# Patient Record
Sex: MALE | Race: WHITE | NOT HISPANIC OR LATINO | URBAN - METROPOLITAN AREA
[De-identification: names, ages, dates, MRNs, and addresses within clinical notes are randomized per-mention and may not be internally consistent; named-entity substitution may affect disease eponyms.]

---

## 2023-03-16 ENCOUNTER — NURSING HOME VISIT (OUTPATIENT)
Dept: POST ACUTE CARE | Facility: EXTERNAL LOCATION | Age: 73
End: 2023-03-16
Payer: MEDICARE

## 2023-03-16 DIAGNOSIS — R53.1 WEAKNESS: ICD-10-CM

## 2023-03-16 DIAGNOSIS — E11.9 TYPE 2 DIABETES MELLITUS WITHOUT COMPLICATION, WITHOUT LONG-TERM CURRENT USE OF INSULIN (MULTI): ICD-10-CM

## 2023-03-16 DIAGNOSIS — M51.9 LUMBAR DISC DISEASE: Primary | ICD-10-CM

## 2023-03-16 PROBLEM — E78.5 HYPERLIPIDEMIA: Status: ACTIVE | Noted: 2023-03-16

## 2023-03-16 PROBLEM — J44.9 COPD (CHRONIC OBSTRUCTIVE PULMONARY DISEASE) (MULTI): Status: ACTIVE | Noted: 2023-03-16

## 2023-03-16 PROBLEM — I63.9 CVA (CEREBRAL VASCULAR ACCIDENT) (MULTI): Status: ACTIVE | Noted: 2023-03-16

## 2023-03-16 PROBLEM — I10 PRIMARY HYPERTENSION: Status: ACTIVE | Noted: 2023-03-16

## 2023-03-16 PROBLEM — I73.9 PVD (PERIPHERAL VASCULAR DISEASE) (CMS-HCC): Status: ACTIVE | Noted: 2023-03-16

## 2023-03-16 PROCEDURE — 99308 SBSQ NF CARE LOW MDM 20: CPT | Performed by: NURSE PRACTITIONER

## 2023-03-16 ASSESSMENT — ENCOUNTER SYMPTOMS
CARDIOVASCULAR NEGATIVE: 1
GASTROINTESTINAL NEGATIVE: 1
BACK PAIN: 1
ARTHRALGIAS: 1
RESPIRATORY NEGATIVE: 1

## 2023-03-16 NOTE — LETTER
"Patient: Leopoldo Wells  : 1950    Encounter Date: 2023    Subjective  Patient ID: Leopoldo Wells is a 72 y.o. male who presents for No chief complaint on file..  71 yo male with history of DMII, HTN, hyperlipidemia, CVA, lumbar disease disease, COPD.  Resident admitted to [facility] rehab for weakness.          Review of Systems   Respiratory: Negative.     Cardiovascular: Negative.    Gastrointestinal: Negative.    Musculoskeletal:  Positive for arthralgias, back pain and gait problem.        States he has been seen by pain mgmt previously and \"fired\" them.  States nothing has helped his pain except for Oxycodone.         Objective  Physical Exam  Constitutional:       General: He is not in acute distress.  Cardiovascular:      Rate and Rhythm: Normal rate and regular rhythm.      Heart sounds: Normal heart sounds.   Pulmonary:      Effort: Pulmonary effort is normal.      Breath sounds: Normal breath sounds.   Abdominal:      General: Bowel sounds are normal.   Musculoskeletal:      Right lower leg: No edema.      Left lower leg: No edema.      Comments: Reports he is having pain 7.5/10 in lower back.  He was recently given oral pain meds.    Neurological:      Mental Status: He is alert.         No current outpatient medications on file.     Assessment/Plan  Problem List Items Addressed This Visit          Musculoskeletal    Lumbar disc disease - Primary       Endocrine/Metabolic    Type 2 diabetes mellitus without complication, without long-term current use of insulin (CMS/[facility])       Other    Weakness     Will continue with physical therapy.  Continue to monitor.  Get labs and re-evaluate.          Electronically Signed By: ISAK Damon   3/16/23  2:13 PM  "

## 2023-03-16 NOTE — PROGRESS NOTES
"Subjective   Patient ID: Leopoldo Wells is a 72 y.o. male who presents for No chief complaint on file..  71 yo male with history of DMII, HTN, hyperlipidemia, CVA, lumbar disease disease, COPD.  Resident admitted to [facility] rehab for weakness.          Review of Systems   Respiratory: Negative.     Cardiovascular: Negative.    Gastrointestinal: Negative.    Musculoskeletal:  Positive for arthralgias, back pain and gait problem.        States he has been seen by pain mgmt previously and \"fired\" them.  States nothing has helped his pain except for Oxycodone.         Objective   Physical Exam  Constitutional:       General: He is not in acute distress.  Cardiovascular:      Rate and Rhythm: Normal rate and regular rhythm.      Heart sounds: Normal heart sounds.   Pulmonary:      Effort: Pulmonary effort is normal.      Breath sounds: Normal breath sounds.   Abdominal:      General: Bowel sounds are normal.   Musculoskeletal:      Right lower leg: No edema.      Left lower leg: No edema.      Comments: Reports he is having pain 7.5/10 in lower back.  He was recently given oral pain meds.    Neurological:      Mental Status: He is alert.         No current outpatient medications on file.     Assessment/Plan   Problem List Items Addressed This Visit          Musculoskeletal    Lumbar disc disease - Primary       Endocrine/Metabolic    Type 2 diabetes mellitus without complication, without long-term current use of insulin (CMS/[facility])       Other    Weakness     Will continue with physical therapy.  Continue to monitor.  Get labs and re-evaluate.      "

## 2023-03-17 ENCOUNTER — NURSING HOME VISIT (OUTPATIENT)
Dept: PRIMARY CARE | Facility: CLINIC | Age: 73
End: 2023-03-17
Payer: MEDICARE

## 2023-03-17 DIAGNOSIS — R05.1 ACUTE COUGH: Primary | ICD-10-CM

## 2023-03-17 DIAGNOSIS — I63.9 CEREBROVASCULAR ACCIDENT (CVA), UNSPECIFIED MECHANISM (MULTI): ICD-10-CM

## 2023-03-17 PROCEDURE — 99305 1ST NF CARE MODERATE MDM 35: CPT | Performed by: INTERNAL MEDICINE

## 2023-03-17 ASSESSMENT — ENCOUNTER SYMPTOMS
HEMATOLOGIC/LYMPHATIC NEGATIVE: 1
MUSCULOSKELETAL NEGATIVE: 1
AGITATION: 0
NEUROLOGICAL NEGATIVE: 1
SHORTNESS OF BREATH: 0
NAUSEA: 0
FEVER: 0
ENDOCRINE NEGATIVE: 1
HEADACHES: 0
ABDOMINAL PAIN: 0
BACK PAIN: 0
LIGHT-HEADEDNESS: 0
CONSTITUTIONAL NEGATIVE: 1
ADENOPATHY: 0
EYE DISCHARGE: 0
ALLERGIC/IMMUNOLOGIC NEGATIVE: 1
PSYCHIATRIC NEGATIVE: 1
GASTROINTESTINAL NEGATIVE: 1
JOINT SWELLING: 0
DIARRHEA: 0
CHILLS: 0
CARDIOVASCULAR NEGATIVE: 1
NUMBNESS: 0
CONSTIPATION: 0
NECK STIFFNESS: 0
COUGH: 1
ABDOMINAL DISTENTION: 0
WHEEZING: 0
PALPITATIONS: 0
VOMITING: 0
EYES NEGATIVE: 1
CONFUSION: 0
DYSURIA: 0

## 2023-03-17 NOTE — PROGRESS NOTES
Subjective   Patient ID: Leopoldo Wells is a 72 y.o. male who presents for No chief complaint on file..  WITH PMH CAD CVA PVD DM2 LBP HTN DERICK HERE FOR REHAB AFTER ARF, C/O FREQUENT COUGH NO SOB NO FEVER        Review of Systems   Constitutional: Negative.  Negative for chills and fever.   HENT: Negative.  Negative for congestion.    Eyes: Negative.  Negative for discharge.   Respiratory:  Positive for cough. Negative for shortness of breath and wheezing.    Cardiovascular: Negative.  Negative for chest pain, palpitations and leg swelling.   Gastrointestinal: Negative.  Negative for abdominal distention, abdominal pain, constipation, diarrhea, nausea and vomiting.   Endocrine: Negative.    Genitourinary: Negative.  Negative for dysuria and urgency.   Musculoskeletal: Negative.  Negative for back pain, joint swelling and neck stiffness.   Skin: Negative.  Negative for rash.   Allergic/Immunologic: Negative.  Negative for immunocompromised state.   Neurological: Negative.  Negative for light-headedness, numbness and headaches.   Hematological: Negative.  Negative for adenopathy.   Psychiatric/Behavioral: Negative.  Negative for agitation, behavioral problems and confusion.    All other systems reviewed and are negative.      Objective   Physical Exam  Vitals reviewed.   Constitutional:       General: He is not in acute distress.     Appearance: Normal appearance.   HENT:      Head: Normocephalic and atraumatic.      Nose: Nose normal.   Eyes:      Conjunctiva/sclera: Conjunctivae normal.      Pupils: Pupils are equal, round, and reactive to light.   Neck:      Vascular: No carotid bruit.   Cardiovascular:      Rate and Rhythm: Normal rate and regular rhythm.      Pulses: Normal pulses.      Heart sounds:      No gallop.   Pulmonary:      Effort: Pulmonary effort is normal. No respiratory distress.      Breath sounds: Normal breath sounds. No wheezing.   Abdominal:      General: Bowel sounds are normal.      Palpations:  Abdomen is soft.      Tenderness: There is no abdominal tenderness.   Musculoskeletal:         General: Normal range of motion.      Cervical back: Normal range of motion. No rigidity.   Lymphadenopathy:      Cervical: No cervical adenopathy.   Skin:     General: Skin is warm.      Findings: No rash.   Neurological:      Mental Status: He is alert and oriented to person, place, and time.      Comments: LEFT SIDE FACE DROOPING MOTOR 5/5   Psychiatric:         Mood and Affect: Mood normal.         Behavior: Behavior normal.       PMH, FH, SH REVIEWED FROM [facility] CHART  MEDICATIONS REVIEWED  SPOKE WITH NURSE ABOUT THE PATIENT RECENT WELLBEING AND CONDITION  Assessment/Plan   Problem List Items Addressed This Visit          Nervous    CVA (cerebral vascular accident) (CMS/[facility])     Other Visit Diagnoses       Acute cough    -  Primary           CXR MUCINEX  CONTINUE SAME MANAGEMENT

## 2023-03-20 ENCOUNTER — NURSING HOME VISIT (OUTPATIENT)
Dept: POST ACUTE CARE | Facility: EXTERNAL LOCATION | Age: 73
End: 2023-03-20
Payer: MEDICARE

## 2023-03-20 DIAGNOSIS — I63.9 CEREBROVASCULAR ACCIDENT (CVA), UNSPECIFIED MECHANISM (MULTI): ICD-10-CM

## 2023-03-20 DIAGNOSIS — M51.9 LUMBAR DISC DISEASE: Primary | ICD-10-CM

## 2023-03-20 PROBLEM — R05.3 CHRONIC COUGH: Status: ACTIVE | Noted: 2023-03-20

## 2023-03-20 PROCEDURE — 99307 SBSQ NF CARE SF MDM 10: CPT | Performed by: NURSE PRACTITIONER

## 2023-03-20 ASSESSMENT — ENCOUNTER SYMPTOMS
CARDIOVASCULAR NEGATIVE: 1
APPETITE CHANGE: 0
GASTROINTESTINAL NEGATIVE: 1
BACK PAIN: 1
COUGH: 1

## 2023-03-20 NOTE — PROGRESS NOTES
Subjective   Patient ID: Leopoldo Wells is a 72 y.o. male who presents for No chief complaint on file..  73 yo male with history of cva, pvd, lumbago, and cough, on rehab unit at [facility].  Resident states his pain is better controlled.  Working well with physical therapy and cough has improved since given Mucinex over the weekend.  States he is working with speech therapy due to some dysphagia which has also improved.         Review of Systems   Constitutional:  Negative for appetite change.   Respiratory:  Positive for cough.    Cardiovascular: Negative.    Gastrointestinal: Negative.    Musculoskeletal:  Positive for back pain.       Objective   Physical Exam  Cardiovascular:      Rate and Rhythm: Normal rate and regular rhythm.   Pulmonary:      Effort: Pulmonary effort is normal.      Comments: Slight wheeze heard in right middle lobe on forced expiration.    Abdominal:      General: Bowel sounds are normal.   Neurological:      Mental Status: He is alert.   Psychiatric:         Mood and Affect: Mood normal.         No current outpatient medications on file.     Assessment/Plan   Problem List Items Addressed This Visit          Nervous    CVA (cerebral vascular accident) (CMS/HCC)       Musculoskeletal    Lumbar disc disease - Primary     Continue same regimen.  Continue same medications and continue to monitor.

## 2023-03-20 NOTE — LETTER
Patient: Leopoldo Wells  : 1950    Encounter Date: 2023    Subjective  Patient ID: Leopoldo Wells is a 72 y.o. male who presents for No chief complaint on file..  71 yo male with history of cva, pvd, lumbago, and cough, on rehab unit at [facility].  Resident states his pain is better controlled.  Working well with physical therapy and cough has improved since given Mucinex over the weekend.  States he is working with speech therapy due to some dysphagia which has also improved.         Review of Systems   Constitutional:  Negative for appetite change.   Respiratory:  Positive for cough.    Cardiovascular: Negative.    Gastrointestinal: Negative.    Musculoskeletal:  Positive for back pain.       Objective  Physical Exam  Cardiovascular:      Rate and Rhythm: Normal rate and regular rhythm.   Pulmonary:      Effort: Pulmonary effort is normal.      Comments: Slight wheeze heard in right middle lobe on forced expiration.    Abdominal:      General: Bowel sounds are normal.   Neurological:      Mental Status: He is alert.   Psychiatric:         Mood and Affect: Mood normal.         No current outpatient medications on file.     Assessment/Plan  Problem List Items Addressed This Visit          Nervous    CVA (cerebral vascular accident) (CMS/[facility])       Musculoskeletal    Lumbar disc disease - Primary     Continue same regimen.  Continue same medications and continue to monitor.           Electronically Signed By: ISAK Damon   3/20/23  3:47 PM

## 2023-03-21 ENCOUNTER — NURSING HOME VISIT (OUTPATIENT)
Dept: POST ACUTE CARE | Facility: EXTERNAL LOCATION | Age: 73
End: 2023-03-21
Payer: MEDICARE

## 2023-03-21 DIAGNOSIS — D50.9 IRON DEFICIENCY ANEMIA, UNSPECIFIED IRON DEFICIENCY ANEMIA TYPE: Primary | ICD-10-CM

## 2023-03-21 PROCEDURE — 99308 SBSQ NF CARE LOW MDM 20: CPT | Performed by: NURSE PRACTITIONER

## 2023-03-21 ASSESSMENT — ENCOUNTER SYMPTOMS
WEAKNESS: 1
GASTROINTESTINAL NEGATIVE: 1
FATIGUE: 0
CARDIOVASCULAR NEGATIVE: 1
APPETITE CHANGE: 0
RESPIRATORY NEGATIVE: 1

## 2023-03-21 NOTE — LETTER
Patient: Leopoldo Wells  : 1950    Encounter Date: 2023    Subjective  Patient ID: Leopoldo Wells is a 72 y.o. male who presents for No chief complaint on file..  71 yo male at [facility] on rehab unit after CVA.  Labs reviewed with resident.  Resident reports he had previous history of anemia however, his PCP informed him that he no longer needed to be on iron supplements.  States he had a colonoscopy approximately 1 year ago which showed polyps.  Resident denies any increased sob, abd. Pain, blood in stool.          Review of Systems   Constitutional:  Negative for appetite change and fatigue.   Respiratory: Negative.     Cardiovascular: Negative.    Gastrointestinal: Negative.    Neurological:  Positive for weakness.        Weakness improving with physical therapy       Objective  Physical Exam  Constitutional:       Appearance: Normal appearance.   Cardiovascular:      Heart sounds: Normal heart sounds.   Pulmonary:      Effort: Pulmonary effort is normal.      Breath sounds: Normal breath sounds.   Abdominal:      General: Bowel sounds are normal.      Tenderness: There is no abdominal tenderness.   Neurological:      Mental Status: He is alert.         No current outpatient medications on file.     Assessment/Plan  Problem List Items Addressed This Visit          Hematologic    Iron deficiency anemia - Primary     Labs reviewed with resident.  Will get occult stool.  Start on Ferrous Sulfate 325mg once daily.  Get results from previous colonoscopy to review.  Continue to monitor.           Electronically Signed By: ISAK Damon   3/21/23 12:27 PM

## 2023-03-21 NOTE — PROGRESS NOTES
Subjective   Patient ID: Leopoldo Wells is a 72 y.o. male who presents for No chief complaint on file..  71 yo male at [facility] on rehab unit after CVA.  Labs reviewed with resident.  Resident reports he had previous history of anemia however, his PCP informed him that he no longer needed to be on iron supplements.  States he had a colonoscopy approximately 1 year ago which showed polyps.  Resident denies any increased sob, abd. Pain, blood in stool.          Review of Systems   Constitutional:  Negative for appetite change and fatigue.   Respiratory: Negative.     Cardiovascular: Negative.    Gastrointestinal: Negative.    Neurological:  Positive for weakness.        Weakness improving with physical therapy       Objective   Physical Exam  Constitutional:       Appearance: Normal appearance.   Cardiovascular:      Heart sounds: Normal heart sounds.   Pulmonary:      Effort: Pulmonary effort is normal.      Breath sounds: Normal breath sounds.   Abdominal:      General: Bowel sounds are normal.      Tenderness: There is no abdominal tenderness.   Neurological:      Mental Status: He is alert.         No current outpatient medications on file.     Assessment/Plan   Problem List Items Addressed This Visit          Hematologic    Iron deficiency anemia - Primary     Labs reviewed with resident.  Will get occult stool.  Start on Ferrous Sulfate 325mg once daily.  Get results from previous colonoscopy to review.  Continue to monitor.

## 2023-03-31 ENCOUNTER — NURSING HOME VISIT (OUTPATIENT)
Dept: POST ACUTE CARE | Facility: EXTERNAL LOCATION | Age: 73
End: 2023-03-31
Payer: MEDICARE

## 2023-03-31 DIAGNOSIS — I63.9 CEREBROVASCULAR ACCIDENT (CVA), UNSPECIFIED MECHANISM (MULTI): Primary | ICD-10-CM

## 2023-03-31 DIAGNOSIS — J44.9 CHRONIC OBSTRUCTIVE PULMONARY DISEASE, UNSPECIFIED COPD TYPE (MULTI): ICD-10-CM

## 2023-03-31 PROCEDURE — 99308 SBSQ NF CARE LOW MDM 20: CPT | Performed by: INTERNAL MEDICINE

## 2023-03-31 NOTE — PROGRESS NOTES
Pt is doing fine , no complaint  GA: Comfortable, no distress  ROS: No SOB  Medications reviewed  Head: Normal  Neck: Soft  Heart: Regular  Lungs: Clear  Abdomen: soft    Impression: clinically doing fine, continue current management    Problem List Items Addressed This Visit          Nervous    CVA (cerebral vascular accident) (CMS/[facility]) - Primary       Respiratory    COPD (chronic obstructive pulmonary disease) (CMS/[facility])

## 2023-03-31 NOTE — LETTER
Patient: Leopoldo Wells  : 1950    Encounter Date: 2023    Pt is doing fine , no complaint  GA: Comfortable, no distress  ROS: No SOB  Medications reviewed  Head: Normal  Neck: Soft  Heart: Regular  Lungs: Clear  Abdomen: soft    Impression: clinically doing fine, continue current management    Problem List Items Addressed This Visit          Nervous    CVA (cerebral vascular accident) (CMS/[facility]) - Primary       Respiratory    COPD (chronic obstructive pulmonary disease) (CMS/[facility])          Electronically Signed By: Jett Coronado MD   3/31/23  7:06 PM

## 2023-04-05 ENCOUNTER — NURSING HOME VISIT (OUTPATIENT)
Dept: POST ACUTE CARE | Facility: EXTERNAL LOCATION | Age: 73
End: 2023-04-05
Payer: MEDICARE

## 2023-04-05 DIAGNOSIS — J44.9 CHRONIC OBSTRUCTIVE PULMONARY DISEASE, UNSPECIFIED COPD TYPE (MULTI): ICD-10-CM

## 2023-04-05 DIAGNOSIS — R41.0 CONFUSION: ICD-10-CM

## 2023-04-05 DIAGNOSIS — I63.9 CEREBROVASCULAR ACCIDENT (CVA), UNSPECIFIED MECHANISM (MULTI): Primary | ICD-10-CM

## 2023-04-05 PROCEDURE — 99308 SBSQ NF CARE LOW MDM 20: CPT | Performed by: NURSE PRACTITIONER

## 2023-04-05 NOTE — LETTER
Patient: Leopoldo Wells  : 1950    Encounter Date: 2023    Subjective  Patient ID: Leopoldo Wells is a 72 y.o. male who presents for No chief complaint on file..  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  Staff verbalizes concern for confusion yesterday; believing his remote control was his call light.  Resident denies any recent confusion.          Review of Systems   Constitutional:  Negative for appetite change, chills and fever.   Respiratory: Negative.     Cardiovascular: Negative.    Gastrointestinal: Negative.    Neurological:  Positive for weakness. Negative for dizziness, syncope, speech difficulty and headaches.        Reports weakness is improving with physical therapy.   Psychiatric/Behavioral:  Negative for confusion and dysphoric mood. The patient is not nervous/anxious.        Objective  Physical Exam  Constitutional:       General: He is not in acute distress.  Cardiovascular:      Rate and Rhythm: Regular rhythm.   Pulmonary:      Effort: Pulmonary effort is normal.      Breath sounds: Normal breath sounds.   Abdominal:      General: Bowel sounds are normal.   Neurological:      Mental Status: He is alert and oriented to person, place, and time.   Psychiatric:         Mood and Affect: Mood normal.         Behavior: Behavior normal.         No current outpatient medications on file.     Assessment/Plan  Problem List Items Addressed This Visit          Nervous    CVA (cerebral vascular accident) (CMS/[facility]) - Primary       Respiratory    COPD (chronic obstructive pulmonary disease) (CMS/[facility])     Other Visit Diagnoses       Confusion            Continue same regimen at this time.  Physical therapy continued.  Will get labs and UA C&S for confusion.  Continue to monitor             Electronically Signed By: ISAK Damon   23  8:59 AM

## 2023-04-08 ASSESSMENT — ENCOUNTER SYMPTOMS
DIZZINESS: 0
CONFUSION: 0
SPEECH DIFFICULTY: 0
FEVER: 0
CHILLS: 0
NERVOUS/ANXIOUS: 0
CARDIOVASCULAR NEGATIVE: 1
RESPIRATORY NEGATIVE: 1
APPETITE CHANGE: 0
WEAKNESS: 1
DYSPHORIC MOOD: 0
GASTROINTESTINAL NEGATIVE: 1
HEADACHES: 0

## 2023-04-08 NOTE — PROGRESS NOTES
Subjective   Patient ID: Leopoldo Wells is a 72 y.o. male who presents for No chief complaint on file..  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  Staff verbalizes concern for confusion yesterday; believing his remote control was his call light.  Resident denies any recent confusion.          Review of Systems   Constitutional:  Negative for appetite change, chills and fever.   Respiratory: Negative.     Cardiovascular: Negative.    Gastrointestinal: Negative.    Neurological:  Positive for weakness. Negative for dizziness, syncope, speech difficulty and headaches.        Reports weakness is improving with physical therapy.   Psychiatric/Behavioral:  Negative for confusion and dysphoric mood. The patient is not nervous/anxious.        Objective   Physical Exam  Constitutional:       General: He is not in acute distress.  Cardiovascular:      Rate and Rhythm: Regular rhythm.   Pulmonary:      Effort: Pulmonary effort is normal.      Breath sounds: Normal breath sounds.   Abdominal:      General: Bowel sounds are normal.   Neurological:      Mental Status: He is alert and oriented to person, place, and time.   Psychiatric:         Mood and Affect: Mood normal.         Behavior: Behavior normal.         No current outpatient medications on file.     Assessment/Plan   Problem List Items Addressed This Visit          Nervous    CVA (cerebral vascular accident) (CMS/HCC) - Primary       Respiratory    COPD (chronic obstructive pulmonary disease) (CMS/[facility])     Other Visit Diagnoses       Confusion            Continue same regimen at this time.  Physical therapy continued.  Will get labs and UA C&S for confusion.  Continue to monitor

## 2023-04-26 ENCOUNTER — NURSING HOME VISIT (OUTPATIENT)
Dept: POST ACUTE CARE | Facility: EXTERNAL LOCATION | Age: 73
End: 2023-04-26
Payer: MEDICARE

## 2023-04-26 DIAGNOSIS — D50.9 IRON DEFICIENCY ANEMIA, UNSPECIFIED IRON DEFICIENCY ANEMIA TYPE: Primary | ICD-10-CM

## 2023-04-26 DIAGNOSIS — I73.9 PVD (PERIPHERAL VASCULAR DISEASE) (CMS-HCC): ICD-10-CM

## 2023-04-26 PROCEDURE — 99309 SBSQ NF CARE MODERATE MDM 30: CPT | Performed by: INTERNAL MEDICINE

## 2023-04-26 ASSESSMENT — ENCOUNTER SYMPTOMS
LIGHT-HEADEDNESS: 0
BACK PAIN: 0
CONSTITUTIONAL NEGATIVE: 1
EYES NEGATIVE: 1
CHILLS: 0
EYE DISCHARGE: 0
SHORTNESS OF BREATH: 0
CONSTIPATION: 0
NEUROLOGICAL NEGATIVE: 1
FEVER: 0
NUMBNESS: 0
ABDOMINAL DISTENTION: 0
AGITATION: 0
ADENOPATHY: 0
MUSCULOSKELETAL NEGATIVE: 1
VOMITING: 0
ENDOCRINE NEGATIVE: 1
PSYCHIATRIC NEGATIVE: 1
ABDOMINAL PAIN: 0
CONFUSION: 0
JOINT SWELLING: 0
COUGH: 0
ALLERGIC/IMMUNOLOGIC NEGATIVE: 1
RESPIRATORY NEGATIVE: 1
HEMATOLOGIC/LYMPHATIC NEGATIVE: 1
NECK STIFFNESS: 0
HEADACHES: 0
GASTROINTESTINAL NEGATIVE: 1
PALPITATIONS: 0
DIARRHEA: 0
WHEEZING: 0
DYSURIA: 0
CARDIOVASCULAR NEGATIVE: 1
NAUSEA: 0

## 2023-04-26 NOTE — PROGRESS NOTES
Subjective   Patient ID: Leopoldo Wells is a 72 y.o. male who presents for No chief complaint on file..  PT WAS SEEN IN [facility] NO COMPLAINT        Review of Systems   Constitutional: Negative.  Negative for chills and fever.   HENT: Negative.  Negative for congestion.    Eyes: Negative.  Negative for discharge.   Respiratory: Negative.  Negative for cough, shortness of breath and wheezing.    Cardiovascular: Negative.  Negative for chest pain, palpitations and leg swelling.   Gastrointestinal: Negative.  Negative for abdominal distention, abdominal pain, constipation, diarrhea, nausea and vomiting.   Endocrine: Negative.    Genitourinary: Negative.  Negative for dysuria and urgency.   Musculoskeletal: Negative.  Negative for back pain, joint swelling and neck stiffness.   Skin: Negative.  Negative for rash.   Allergic/Immunologic: Negative.  Negative for immunocompromised state.   Neurological: Negative.  Negative for light-headedness, numbness and headaches.   Hematological: Negative.  Negative for adenopathy.   Psychiatric/Behavioral: Negative.  Negative for agitation, behavioral problems and confusion.    All other systems reviewed and are negative.      Objective   Physical Exam  Vitals reviewed.   Constitutional:       General: He is not in acute distress.     Appearance: Normal appearance.   HENT:      Head: Normocephalic and atraumatic.      Nose: Nose normal.   Eyes:      Conjunctiva/sclera: Conjunctivae normal.      Pupils: Pupils are equal, round, and reactive to light.   Neck:      Vascular: No carotid bruit.   Cardiovascular:      Rate and Rhythm: Normal rate and regular rhythm.      Pulses: Normal pulses.      Heart sounds:      No gallop.   Pulmonary:      Effort: Pulmonary effort is normal. No respiratory distress.      Breath sounds: Normal breath sounds. No wheezing.   Abdominal:      General: Bowel sounds are normal.      Palpations: Abdomen is soft.      Tenderness: There is no abdominal  tenderness.   Musculoskeletal:         General: Normal range of motion.      Cervical back: Normal range of motion. No rigidity.   Lymphadenopathy:      Cervical: No cervical adenopathy.   Skin:     General: Skin is warm.      Findings: No rash.   Neurological:      General: No focal deficit present.      Mental Status: He is alert and oriented to person, place, and time.   Psychiatric:         Mood and Affect: Mood normal.         Behavior: Behavior normal.       PMH, FH, SH REVIEWED FROM [facility] CHART  MEDICATIONS REVIEWED  SPOKE WITH NURSE ABOUT THE PATIENT RECENT WELLBEING AND CONDITION  Assessment/Plan   Problem List Items Addressed This Visit          Circulatory    PVD (peripheral vascular disease) (CMS/HCC)       Hematologic    Iron deficiency anemia - Primary    Relevant Orders    Colonoscopy    EGD    Labs reviewed with pt      CONTINUE SAME MANAGEMENT

## 2023-04-26 NOTE — LETTER
Patient: Leopoldo Wells  : 1950    Encounter Date: 2023    Subjective  Patient ID: Leopoldo Wells is a 72 y.o. male who presents for No chief complaint on file..  PT WAS SEEN IN [facility] NO COMPLAINT        Review of Systems   Constitutional: Negative.  Negative for chills and fever.   HENT: Negative.  Negative for congestion.    Eyes: Negative.  Negative for discharge.   Respiratory: Negative.  Negative for cough, shortness of breath and wheezing.    Cardiovascular: Negative.  Negative for chest pain, palpitations and leg swelling.   Gastrointestinal: Negative.  Negative for abdominal distention, abdominal pain, constipation, diarrhea, nausea and vomiting.   Endocrine: Negative.    Genitourinary: Negative.  Negative for dysuria and urgency.   Musculoskeletal: Negative.  Negative for back pain, joint swelling and neck stiffness.   Skin: Negative.  Negative for rash.   Allergic/Immunologic: Negative.  Negative for immunocompromised state.   Neurological: Negative.  Negative for light-headedness, numbness and headaches.   Hematological: Negative.  Negative for adenopathy.   Psychiatric/Behavioral: Negative.  Negative for agitation, behavioral problems and confusion.    All other systems reviewed and are negative.      Objective  Physical Exam  Vitals reviewed.   Constitutional:       General: He is not in acute distress.     Appearance: Normal appearance.   HENT:      Head: Normocephalic and atraumatic.      Nose: Nose normal.   Eyes:      Conjunctiva/sclera: Conjunctivae normal.      Pupils: Pupils are equal, round, and reactive to light.   Neck:      Vascular: No carotid bruit.   Cardiovascular:      Rate and Rhythm: Normal rate and regular rhythm.      Pulses: Normal pulses.      Heart sounds:      No gallop.   Pulmonary:      Effort: Pulmonary effort is normal. No respiratory distress.      Breath sounds: Normal breath sounds. No wheezing.   Abdominal:      General: Bowel sounds are normal.       Palpations: Abdomen is soft.      Tenderness: There is no abdominal tenderness.   Musculoskeletal:         General: Normal range of motion.      Cervical back: Normal range of motion. No rigidity.   Lymphadenopathy:      Cervical: No cervical adenopathy.   Skin:     General: Skin is warm.      Findings: No rash.   Neurological:      General: No focal deficit present.      Mental Status: He is alert and oriented to person, place, and time.   Psychiatric:         Mood and Affect: Mood normal.         Behavior: Behavior normal.       PMH, FH, SH REVIEWED FROM [facility] CHART  MEDICATIONS REVIEWED  SPOKE WITH NURSE ABOUT THE PATIENT RECENT WELLBEING AND CONDITION  Assessment/Plan  Problem List Items Addressed This Visit          Circulatory    PVD (peripheral vascular disease) (CMS/[facility])       Hematologic    Iron deficiency anemia - Primary    Relevant Orders    Colonoscopy    EGD    Labs reviewed with pt      CONTINUE SAME MANAGEMENT      Electronically Signed By: Jett Coronado MD   4/26/23  6:51 PM

## 2023-05-01 ENCOUNTER — TELEPHONE (OUTPATIENT)
Dept: PRIMARY CARE | Facility: CLINIC | Age: 73
End: 2023-05-01
Payer: MEDICARE

## 2023-05-01 DIAGNOSIS — D50.9 IRON DEFICIENCY ANEMIA, UNSPECIFIED IRON DEFICIENCY ANEMIA TYPE: Primary | ICD-10-CM

## 2023-05-04 ENCOUNTER — TELEPHONE (OUTPATIENT)
Dept: PRIMARY CARE | Facility: CLINIC | Age: 73
End: 2023-05-04
Payer: MEDICARE

## 2023-05-16 ENCOUNTER — NURSING HOME VISIT (OUTPATIENT)
Dept: POST ACUTE CARE | Facility: EXTERNAL LOCATION | Age: 73
End: 2023-05-16
Payer: MEDICARE

## 2023-05-16 DIAGNOSIS — I73.9 PVD (PERIPHERAL VASCULAR DISEASE) (CMS-HCC): Primary | ICD-10-CM

## 2023-05-16 PROCEDURE — 99308 SBSQ NF CARE LOW MDM 20: CPT | Performed by: NURSE PRACTITIONER

## 2023-05-16 NOTE — LETTER
Patient: Leopoldo Wells  : 1950    Encounter Date: 2023    Subjective  Patient ID: Leopoldo Wells is a 72 y.o. male who presents for No chief complaint on file..  73 yo male at [facility] with history of PVD.  Resident does see vascular specialist.  Staff reports resident has developed some seeping and blistering of lower extremities and toes.  Denies any pain in lower extremities.          Review of Systems   Constitutional:  Negative for fever.   Respiratory: Negative.     Cardiovascular:  Positive for leg swelling. Negative for chest pain and palpitations.   Gastrointestinal: Negative.        Objective  Physical Exam  Constitutional:       Appearance: He is obese.   Cardiovascular:      Rate and Rhythm: Normal rate and regular rhythm.   Pulmonary:      Effort: Pulmonary effort is normal.      Breath sounds: Normal breath sounds.   Musculoskeletal:      Right lower leg: Edema present.      Left lower leg: Edema present.      Comments: Bilateral lower extremities, above ankles with 1+ edema and blistering noted.  Toes especially on right foot with yellow crusting noted.     Neurological:      Mental Status: He is alert.         No current outpatient medications on file.     Assessment/Plan  Problem List Items Addressed This Visit          Circulatory    PVD (peripheral vascular disease) (CMS/[facility]) - Primary   Resident reports he recently had vascular dopplers performed by vascular specialist.  Will get results for chart to review.  Start on Keflex 500mg bid x 7 days.  May use Ace wraps to lower extremities for swelling.  Keep legs and feet elevated while in chair or bed.  Continue to monitor.             Electronically Signed By: ISAK Damon   23  1:41 PM

## 2023-05-19 ASSESSMENT — ENCOUNTER SYMPTOMS
FEVER: 0
PALPITATIONS: 0
GASTROINTESTINAL NEGATIVE: 1
RESPIRATORY NEGATIVE: 1

## 2023-05-19 NOTE — PROGRESS NOTES
Subjective   Patient ID: Leoplodo Wells is a 72 y.o. male who presents for No chief complaint on file..  73 yo male at [facility] with history of PVD.  Resident does see vascular specialist.  Staff reports resident has developed some seeping and blistering of lower extremities and toes.  Denies any pain in lower extremities.          Review of Systems   Constitutional:  Negative for fever.   Respiratory: Negative.     Cardiovascular:  Positive for leg swelling. Negative for chest pain and palpitations.   Gastrointestinal: Negative.        Objective   Physical Exam  Constitutional:       Appearance: He is obese.   Cardiovascular:      Rate and Rhythm: Normal rate and regular rhythm.   Pulmonary:      Effort: Pulmonary effort is normal.      Breath sounds: Normal breath sounds.   Musculoskeletal:      Right lower leg: Edema present.      Left lower leg: Edema present.      Comments: Bilateral lower extremities, above ankles with 1+ edema and blistering noted.  Toes especially on right foot with yellow crusting noted.     Neurological:      Mental Status: He is alert.         No current outpatient medications on file.     Assessment/Plan   Problem List Items Addressed This Visit          Circulatory    PVD (peripheral vascular disease) (CMS/[facility]) - Primary   Resident reports he recently had vascular dopplers performed by vascular specialist.  Will get results for chart to review.  Start on Keflex 500mg bid x 7 days.  May use Ace wraps to lower extremities for swelling.  Keep legs and feet elevated while in chair or bed.  Continue to monitor.

## 2023-05-23 ENCOUNTER — NURSING HOME VISIT (OUTPATIENT)
Dept: POST ACUTE CARE | Facility: EXTERNAL LOCATION | Age: 73
End: 2023-05-23
Payer: MEDICARE

## 2023-05-23 DIAGNOSIS — L03.119 CELLULITIS OF LOWER EXTREMITY, UNSPECIFIED LATERALITY: Primary | ICD-10-CM

## 2023-05-23 DIAGNOSIS — L30.9 DERMATITIS: ICD-10-CM

## 2023-05-23 PROCEDURE — 99308 SBSQ NF CARE LOW MDM 20: CPT | Performed by: INTERNAL MEDICINE

## 2023-05-23 NOTE — LETTER
Patient: Leopoldo Wells  : 1950    Encounter Date: 2023    Pt was seen in the NH,CO RED ITCHY LEGS  General appearance: Comfortable, no distress  ROS: No SOB  Medications reviewed  Head: Normal  Neck: Soft  Heart: Regular  Lungs: Clear  Abdomen: soft  EXT B/L LEG ERYTHEMA WARM WITH SCRATCH SIGNS    Impression: AUGMENTIN, LOTRISONE, continue current management    Problem List Items Addressed This Visit          Musculoskeletal    Cellulitis of lower extremity - Primary     Other Visit Diagnoses       Dermatitis                   Electronically Signed By: Jett Coronado MD   23  4:54 PM

## 2023-05-23 NOTE — PROGRESS NOTES
Pt was seen in the NH,CO RED ITCHY LEGS  General appearance: Comfortable, no distress  ROS: No SOB  Medications reviewed  Head: Normal  Neck: Soft  Heart: Regular  Lungs: Clear  Abdomen: soft  EXT B/L LEG ERYTHEMA WARM WITH SCRATCH SIGNS    Impression: AUGMENTIN, LOTRISONE, continue current management    Problem List Items Addressed This Visit          Musculoskeletal    Cellulitis of lower extremity - Primary     Other Visit Diagnoses       Dermatitis

## 2023-05-30 ENCOUNTER — NURSING HOME VISIT (OUTPATIENT)
Dept: POST ACUTE CARE | Facility: EXTERNAL LOCATION | Age: 73
End: 2023-05-30
Payer: MEDICARE

## 2023-05-30 DIAGNOSIS — G89.29 CHRONIC PAIN OF BOTH KNEES: Primary | ICD-10-CM

## 2023-05-30 DIAGNOSIS — M25.562 CHRONIC PAIN OF BOTH KNEES: Primary | ICD-10-CM

## 2023-05-30 DIAGNOSIS — M25.561 CHRONIC PAIN OF BOTH KNEES: Primary | ICD-10-CM

## 2023-05-30 PROCEDURE — 99309 SBSQ NF CARE MODERATE MDM 30: CPT | Performed by: NURSE PRACTITIONER

## 2023-05-30 NOTE — LETTER
Patient: Leopoldo Wells  : 1950    Encounter Date: 2023    Subjective  Patient ID: Leopoldo Wells is a 72 y.o. male who presents for No chief complaint on file..  71 yo male at [facility] with c/o pain in bilateral knees.  States his pain has been increased the past few days.  Pain is 5/10 and greater when walking.  Denies feeling like knees are giving out.  Denies swelling or warmth.  States has a history of gout.  Denies recent fall or injury to knees.          Review of Systems   Constitutional:  Negative for fever.   Respiratory: Negative.     Cardiovascular: Negative.    Gastrointestinal: Negative.    Musculoskeletal:  Positive for arthralgias, back pain and myalgias.       Objective  Physical Exam  Constitutional:       General: He is not in acute distress.  Cardiovascular:      Rate and Rhythm: Normal rate and regular rhythm.   Pulmonary:      Effort: Pulmonary effort is normal.      Breath sounds: Normal breath sounds.   Abdominal:      General: Bowel sounds are normal.   Musculoskeletal:         General: Tenderness present. No swelling, deformity or signs of injury.      Right lower leg: No edema.      Left lower leg: No edema.      Comments: Drawer negative bilateral knees.  Warm to touch.  No erythema noted to bilateral knees.  Increased pain on extension of rubi knees.  No edema noted.  Ballottement neg   Neurological:      Mental Status: He is alert.         No current outpatient medications on file.     Assessment/Plan  Problem List Items Addressed This Visit          Other    Bilateral knee pain - Primary   Get x-rays of both knees.  May start Voltaren Gel apply to both knees bid.  Resident states he used to take Mobic at home for the pain however, one of his previous providers removed this medication.  Continue PT.  Continue to monitor.  Await results.             Electronically Signed By: ISAK Damon   23  2:45 PM

## 2023-05-31 PROBLEM — M25.562 BILATERAL KNEE PAIN: Status: ACTIVE | Noted: 2023-05-31

## 2023-05-31 PROBLEM — M25.561 BILATERAL KNEE PAIN: Status: ACTIVE | Noted: 2023-05-31

## 2023-05-31 ASSESSMENT — ENCOUNTER SYMPTOMS
BACK PAIN: 1
CARDIOVASCULAR NEGATIVE: 1
FEVER: 0
RESPIRATORY NEGATIVE: 1
MYALGIAS: 1
ARTHRALGIAS: 1
GASTROINTESTINAL NEGATIVE: 1

## 2023-05-31 NOTE — PROGRESS NOTES
Subjective   Patient ID: Leopoldo Wells is a 72 y.o. male who presents for No chief complaint on file..  73 yo male at \[facility]\"" with c/o pain in bilateral knees.  States his pain has been increased the past few days.  Pain is 5/10 and greater when walking.  Denies feeling like knees are giving out.  Denies swelling or warmth.  States has a history of gout.  Denies recent fall or injury to knees.          Review of Systems   Constitutional:  Negative for fever.   Respiratory: Negative.     Cardiovascular: Negative.    Gastrointestinal: Negative.    Musculoskeletal:  Positive for arthralgias, back pain and myalgias.       Objective   Physical Exam  Constitutional:       General: He is not in acute distress.  Cardiovascular:      Rate and Rhythm: Normal rate and regular rhythm.   Pulmonary:      Effort: Pulmonary effort is normal.      Breath sounds: Normal breath sounds.   Abdominal:      General: Bowel sounds are normal.   Musculoskeletal:         General: Tenderness present. No swelling, deformity or signs of injury.      Right lower leg: No edema.      Left lower leg: No edema.      Comments: Drawer negative bilateral knees.  Warm to touch.  No erythema noted to bilateral knees.  Increased pain on extension of rubi knees.  No edema noted.  Ballottement neg   Neurological:      Mental Status: He is alert.         No current outpatient medications on file.     Assessment/Plan   Problem List Items Addressed This Visit          Other    Bilateral knee pain - Primary   Get x-rays of both knees.  May start Voltaren Gel apply to both knees bid.  Resident states he used to take Mobic at home for the pain however, one of his previous providers removed this medication.  Continue PT.  Continue to monitor.  Await results.

## 2023-06-08 ENCOUNTER — TELEPHONE (OUTPATIENT)
Dept: PRIMARY CARE | Facility: CLINIC | Age: 73
End: 2023-06-08
Payer: MEDICARE

## 2023-06-08 ENCOUNTER — HOSPITAL ENCOUNTER (OUTPATIENT)
Dept: DATA CONVERSION | Facility: HOSPITAL | Age: 73
End: 2023-06-08
Attending: INTERNAL MEDICINE | Admitting: INTERNAL MEDICINE
Payer: MEDICARE

## 2023-06-08 DIAGNOSIS — Z86.73 PERSONAL HISTORY OF TRANSIENT ISCHEMIC ATTACK (TIA), AND CEREBRAL INFARCTION WITHOUT RESIDUAL DEFICITS: ICD-10-CM

## 2023-06-08 DIAGNOSIS — D64.9 ANEMIA, UNSPECIFIED: ICD-10-CM

## 2023-06-08 DIAGNOSIS — E11.9 TYPE 2 DIABETES MELLITUS WITHOUT COMPLICATIONS (MULTI): ICD-10-CM

## 2023-06-08 DIAGNOSIS — I10 ESSENTIAL (PRIMARY) HYPERTENSION: ICD-10-CM

## 2023-06-08 DIAGNOSIS — E78.00 PURE HYPERCHOLESTEROLEMIA, UNSPECIFIED: ICD-10-CM

## 2023-06-08 DIAGNOSIS — D50.9 IRON DEFICIENCY ANEMIA, UNSPECIFIED: ICD-10-CM

## 2023-06-08 DIAGNOSIS — F41.9 ANXIETY DISORDER, UNSPECIFIED: ICD-10-CM

## 2023-06-08 DIAGNOSIS — Z53.8 PROCEDURE AND TREATMENT NOT CARRIED OUT FOR OTHER REASONS: ICD-10-CM

## 2023-06-08 DIAGNOSIS — I73.9 PERIPHERAL VASCULAR DISEASE, UNSPECIFIED (CMS-HCC): ICD-10-CM

## 2023-06-13 DIAGNOSIS — D50.9 IRON DEFICIENCY ANEMIA, UNSPECIFIED IRON DEFICIENCY ANEMIA TYPE: Primary | ICD-10-CM

## 2023-06-20 LAB
COMPLETE PATHOLOGY REPORT: NORMAL
CONVERTED CLINICAL DIAGNOSIS-HISTORY: NORMAL
CONVERTED FINAL DIAGNOSIS: NORMAL
CONVERTED FINAL REPORT PDF LINK TO COPY AND PASTE: NORMAL
CONVERTED GROSS DESCRIPTION: NORMAL

## 2023-06-28 ENCOUNTER — NURSING HOME VISIT (OUTPATIENT)
Dept: POST ACUTE CARE | Facility: EXTERNAL LOCATION | Age: 73
End: 2023-06-28
Payer: MEDICARE

## 2023-06-28 DIAGNOSIS — I73.9 PVD (PERIPHERAL VASCULAR DISEASE) (CMS-HCC): Primary | ICD-10-CM

## 2023-06-28 DIAGNOSIS — L98.499 ARTERIAL INSUFFICIENCY WITH ISCHEMIC ULCER (MULTI): ICD-10-CM

## 2023-06-28 DIAGNOSIS — I77.1 ARTERIAL INSUFFICIENCY WITH ISCHEMIC ULCER (MULTI): ICD-10-CM

## 2023-06-28 PROCEDURE — 99308 SBSQ NF CARE LOW MDM 20: CPT | Performed by: INTERNAL MEDICINE

## 2023-06-28 NOTE — LETTER
Patient: Leopoldo Wells  : 1950    Encounter Date: 2023    Pt was seen in the NH,Pt co pain in lower ext , insist on higher dosage of pain meds, wants morphine  General appearance: Comfortable, no distress  ROS: No SOB  Medications reviewed  Head: Normal  Neck: Soft  Heart: Regular  Lungs: Clear  Abdomen: soft  Ext multiple dry arteral ulcers on toes, (sees podiartist)    Impression: increase gabapentin, pain clinic referral, suspect drug seeking behavior , fu with podiatrist, continue current management    Problem List Items Addressed This Visit       PVD (peripheral vascular disease) (CMS/HCC) - Primary     Other Visit Diagnoses       Arterial insufficiency with ischemic ulcer (CMS/[facility])                   Electronically Signed By: Jett Coronado MD   23  5:11 PM

## 2023-06-28 NOTE — PROGRESS NOTES
Pt was seen in the NH,Pt co pain in lower ext , insist on higher dosage of pain meds, wants morphine  General appearance: Comfortable, no distress  ROS: No SOB  Medications reviewed  Head: Normal  Neck: Soft  Heart: Regular  Lungs: Clear  Abdomen: soft  Ext multiple dry arteral ulcers on toes, (sees podiartist)    Impression: increase gabapentin, pain clinic referral, suspect drug seeking behavior , fu with podiatrist, continue current management    Problem List Items Addressed This Visit       PVD (peripheral vascular disease) (CMS/HCC) - Primary     Other Visit Diagnoses       Arterial insufficiency with ischemic ulcer (CMS/HCC)

## 2023-07-10 ENCOUNTER — NURSING HOME VISIT (OUTPATIENT)
Dept: POST ACUTE CARE | Facility: EXTERNAL LOCATION | Age: 73
End: 2023-07-10
Payer: MEDICARE

## 2023-07-10 DIAGNOSIS — L03.119 CELLULITIS OF LOWER EXTREMITY, UNSPECIFIED LATERALITY: Primary | ICD-10-CM

## 2023-07-10 DIAGNOSIS — I73.9 PVD (PERIPHERAL VASCULAR DISEASE) (CMS-HCC): ICD-10-CM

## 2023-07-10 PROCEDURE — 99309 SBSQ NF CARE MODERATE MDM 30: CPT | Performed by: NURSE PRACTITIONER

## 2023-07-10 NOTE — LETTER
Patient: Leopoldo Wells  : 1950    Encounter Date: 07/10/2023    Subjective  Patient ID: Leopoldo Wells is a 72 y.o. male who presents for No chief complaint on file..  71 yo male at [facility] with history of PVD, HTN, DMII, iron deficiency, cellulitis.  Staff reports resident started to develop swelling and erythema in his lower extremities approximately 2-3 days ago.  States when staff came into room this am, his lower extremities had developed blistering, drainage, edema and erythema of both lower extremities.  Resident denies fever or chills.  States he does have pain in lower extremities and he has been scratching his legs.          Review of Systems   Constitutional:  Negative for chills, fatigue and fever.   Respiratory: Negative.     Cardiovascular:  Positive for leg swelling. Negative for chest pain and palpitations.   Gastrointestinal: Negative.    Skin:  Positive for color change.       Objective  Physical Exam  Constitutional:       General: He is not in acute distress.  Cardiovascular:      Rate and Rhythm: Normal rate and regular rhythm.   Pulmonary:      Effort: Pulmonary effort is normal.      Breath sounds: Normal breath sounds.   Abdominal:      General: Bowel sounds are normal.   Musculoskeletal:      Right lower leg: Edema present.      Left lower leg: Edema present.   Skin:     Findings: Erythema present.      Comments: Both lower extremities with edema and moderate erythema.  Moderate amount of yellow drainage noted from bilateral lower extremities.     Neurological:      Mental Status: He is alert.         No current outpatient medications on file.     Assessment/Plan  Problem List Items Addressed This Visit          Cardiac and Vasculature    PVD (peripheral vascular disease) (CMS/[facility])       Infectious Diseases    Cellulitis of lower extremity - Primary   Send resident to ER for admission for cellulitis bilateral lower extremities.             Electronically Signed By: Christina Jones  BART-CNP   7/19/23 10:58 AM

## 2023-07-19 ASSESSMENT — ENCOUNTER SYMPTOMS
CHILLS: 0
PALPITATIONS: 0
COLOR CHANGE: 1
FATIGUE: 0
GASTROINTESTINAL NEGATIVE: 1
FEVER: 0
RESPIRATORY NEGATIVE: 1

## 2023-07-19 NOTE — PROGRESS NOTES
Subjective   Patient ID: Leopoldo Wells is a 72 y.o. male who presents for No chief complaint on file..  73 yo male at [facility] with history of PVD, HTN, DMII, iron deficiency, cellulitis.  Staff reports resident started to develop swelling and erythema in his lower extremities approximately 2-3 days ago.  States when staff came into room this am, his lower extremities had developed blistering, drainage, edema and erythema of both lower extremities.  Resident denies fever or chills.  States he does have pain in lower extremities and he has been scratching his legs.          Review of Systems   Constitutional:  Negative for chills, fatigue and fever.   Respiratory: Negative.     Cardiovascular:  Positive for leg swelling. Negative for chest pain and palpitations.   Gastrointestinal: Negative.    Skin:  Positive for color change.       Objective   Physical Exam  Constitutional:       General: He is not in acute distress.  Cardiovascular:      Rate and Rhythm: Normal rate and regular rhythm.   Pulmonary:      Effort: Pulmonary effort is normal.      Breath sounds: Normal breath sounds.   Abdominal:      General: Bowel sounds are normal.   Musculoskeletal:      Right lower leg: Edema present.      Left lower leg: Edema present.   Skin:     Findings: Erythema present.      Comments: Both lower extremities with edema and moderate erythema.  Moderate amount of yellow drainage noted from bilateral lower extremities.     Neurological:      Mental Status: He is alert.         No current outpatient medications on file.     Assessment/Plan   Problem List Items Addressed This Visit          Cardiac and Vasculature    PVD (peripheral vascular disease) (CMS/[facility])       Infectious Diseases    Cellulitis of lower extremity - Primary   Send resident to ER for admission for cellulitis bilateral lower extremities.

## 2023-07-20 ENCOUNTER — DOCUMENTATION (OUTPATIENT)
Dept: PRIMARY CARE | Facility: CLINIC | Age: 73
End: 2023-07-20
Payer: MEDICARE

## 2023-07-21 ENCOUNTER — TELEPHONE (OUTPATIENT)
Dept: PRIMARY CARE | Facility: CLINIC | Age: 73
End: 2023-07-21
Payer: MEDICARE

## 2023-07-21 ENCOUNTER — PATIENT OUTREACH (OUTPATIENT)
Dept: CARE COORDINATION | Facility: CLINIC | Age: 73
End: 2023-07-21
Payer: MEDICARE

## 2023-07-21 NOTE — PROGRESS NOTES
Discharge Facility: [facility]  Discharge Diagnosis: BLE Cellulitis  Admission Date: 7/15/2023  Discharge Date: 7/19/2023    PCP Appointment Date: Unable to reach patient, Message sent to office pool to attempt to follow up and schedule.    Hospital Encounter and Summary: Not available at this time    Unable to reach patient x2 attempts. Unable to leave voicemail at this time.

## 2023-07-21 NOTE — TELEPHONE ENCOUNTER
----- Message from Macy Carrasco RN sent at 7/21/2023 10:34 AM EDT -----  Regarding: hospital discharge follow up  Discharge Facility: [facility]  Discharge Diagnosis: BLE Cellulitis  Admission Date: 7/15/2023  Discharge Date: 7/19/2023    Unsuccessful attempts x2 to reach patient for PCP Follow-up  Please have office staff reach out to patient and schedule an appointment within 7-13 days from discharge date.    Thank you

## 2023-07-24 NOTE — TELEPHONE ENCOUNTER
PT HAS BEEN DISCHARGED FROM [facility]. THEY DID GIVE ME WORKING PHONE NUMBERS FOR [phone] AND WIFE CLAUDIA [phone]. LMOM FOR PT TO CALL BACK. NUMBER WE HAVE ON FILE IS NOT WORKING.

## 2023-09-07 VITALS — WEIGHT: 235.45 LBS | BODY MASS INDEX: 32.96 KG/M2 | HEIGHT: 71 IN
